# Patient Record
Sex: FEMALE | Race: WHITE
[De-identification: names, ages, dates, MRNs, and addresses within clinical notes are randomized per-mention and may not be internally consistent; named-entity substitution may affect disease eponyms.]

---

## 2021-12-12 ENCOUNTER — HOSPITAL ENCOUNTER (EMERGENCY)
Dept: HOSPITAL 4 - SED | Age: 61
Discharge: HOME | End: 2021-12-12
Payer: MEDICAID

## 2021-12-12 VITALS — WEIGHT: 180 LBS | HEIGHT: 64 IN | BODY MASS INDEX: 30.73 KG/M2

## 2021-12-12 VITALS — SYSTOLIC BLOOD PRESSURE: 154 MMHG

## 2021-12-12 VITALS — SYSTOLIC BLOOD PRESSURE: 138 MMHG

## 2021-12-12 DIAGNOSIS — E11.9: ICD-10-CM

## 2021-12-12 DIAGNOSIS — H60.91: Primary | ICD-10-CM

## 2021-12-12 DIAGNOSIS — I10: ICD-10-CM

## 2021-12-12 DIAGNOSIS — Z79.899: ICD-10-CM

## 2021-12-12 DIAGNOSIS — Z79.84: ICD-10-CM

## 2021-12-12 NOTE — NUR
Patient given written and verbal discharge instructions and verbalizes 
understanding.  ER MD DR. NAVARRO discussed with patient the results and treatment 
provided. Patient in stable condition. ID arm band removed. 

Rx of AUGMENTIN 500-125, CORTISPORIN EAR DROPS AND MOTRIN given. Patient 
educated on pain management and to follow up with PMD. Pain Scale 3/10.

Opportunity for questions provided and answered. Medication side effect fact 
sheet provided.

## 2021-12-12 NOTE — NUR
Pt AAO and ambulatory reporting right ear pain X 1 week. Pt denies any 
discharge from ear. Pt rates pain 10/10 on pain scale and has prior history of 
hypertension and diabetes.

## 2023-03-22 ENCOUNTER — HOSPITAL ENCOUNTER (EMERGENCY)
Dept: HOSPITAL 4 - SED | Age: 63
Discharge: HOME | End: 2023-03-22
Payer: MEDICAID

## 2023-03-22 VITALS — HEIGHT: 64 IN | WEIGHT: 195 LBS | BODY MASS INDEX: 33.29 KG/M2

## 2023-03-22 VITALS — SYSTOLIC BLOOD PRESSURE: 144 MMHG

## 2023-03-22 VITALS — SYSTOLIC BLOOD PRESSURE: 132 MMHG

## 2023-03-22 DIAGNOSIS — I10: ICD-10-CM

## 2023-03-22 DIAGNOSIS — Z79.899: ICD-10-CM

## 2023-03-22 DIAGNOSIS — R09.81: ICD-10-CM

## 2023-03-22 DIAGNOSIS — R51.9: Primary | ICD-10-CM

## 2023-03-22 DIAGNOSIS — E11.9: ICD-10-CM

## 2023-03-22 DIAGNOSIS — J02.9: ICD-10-CM

## 2023-03-22 DIAGNOSIS — R11.0: ICD-10-CM

## 2023-03-22 DIAGNOSIS — R05.9: ICD-10-CM

## 2023-03-22 PROCEDURE — 96372 THER/PROPH/DIAG INJ SC/IM: CPT

## 2023-03-22 PROCEDURE — 99283 EMERGENCY DEPT VISIT LOW MDM: CPT

## 2023-03-22 NOTE — NUR
PATIENT BROUGHT IN WITH  FROM HOME FOR HA, SORE THROAT, NASAL CONGESTION 
SINCE SATRUDAY.   REPORTS FEVER, CHILLS NAUSEA AND VOMITING.

## 2023-03-22 NOTE — NUR
Patient given written and verbal discharge instructions and verbalizes 
understanding.  ER MD discussed with patient the results and treatment 
provided. Patient in stable condition. ID arm band removed. 

Rx of MOTRIN, ONDANSETRON, PREDNISONE, SUDAFED given. Patient educated on pain 
management and to follow up with PMD. Pain Scale 0/10

Opportunity for questions provided and answered. Medication side effect fact 
sheet provided.

## 2023-03-22 NOTE — NUR
Placed in room 02  . Placed on cardiac monitor, blood pressure machine and 
pulse oximeter. To gown for exam. Side rails up.

Report given to KARINA ELLIS.